# Patient Record
(demographics unavailable — no encounter records)

---

## 2024-12-06 NOTE — HISTORY OF PRESENT ILLNESS
[Former] : former [Home] : home [FreeTextEntry1] : Hx asthma. Has had asthma since childhood.    Hx of cold-induced urticaria; saw allergist Dr Efra Hollis; labwork done; given epipen.     Also found to have elevated RBCs. She showed me cbc on her phone from 5/8/24 and hgb >15. Repeat reviewed below.   Began in Oct 2024, multiple bouts of cough, congestion. Received abx and prednisone. Prednisone helps. When she is off prednisone, it come back. On flovent. Got breztri from PCP and using it prn which she says help. Albuterol prn.   Stevinson 9/10/24 with mild obstruction.   FENO 12/5/24 is 10 ppb.  Has a cat at home. Says it is hypoallergenic.  Stopped smoking Sept 2016.    She had a CT chest done 12/8/16 which showed nodules. F/U ct done 2019 was w/o change.   She snores. Denies EDS with ESS only 7. HST done 7/15/24 showing mild JENNIFER.    [TextBox_100] : 7/15/24 [TextBox_108] : 6.1 [TextBox_112] : 1.4 [ESS] : 7

## 2024-12-06 NOTE — REVIEW OF SYSTEMS
[de-identified] : stoma is healing well, no air escape  [Normal] : mucosa is normal [Midline] : trachea located in midline position [As Noted in HPI] : as noted in HPI [Negative] : Sleep Disorder [Fever] : no fever [Chills] : no chills [Fatigue] : no fatigue

## 2024-12-06 NOTE — PHYSICAL EXAM
[General Appearance - In No Acute Distress] : no acute distress [Normal Conjunctiva] : the conjunctiva exhibited no abnormalities [Low Lying Soft Palate] : low lying soft palate [Neck Appearance] : the appearance of the neck was normal [] : the neck was supple [Jugular Venous Distention Increased] : there was no jugular-venous distention [Heart Rate And Rhythm] : heart rate and rhythm were normal [Heart Sounds] : normal S1 and S2 [Bowel Sounds] : normal bowel sounds [Abdomen Soft] : soft [Abdomen Tenderness] : non-tender [Abnormal Walk] : normal gait [Nail Clubbing] : no clubbing of the fingernails [Cyanosis, Localized] : no localized cyanosis [Cranial Nerves] : cranial nerves 2-12 were intact [No Focal Deficits] : no focal deficits [Oriented To Time, Place, And Person] : oriented to person, place, and time [Impaired Insight] : insight and judgment were intact [Affect] : the affect was normal [Normal Rate] : the respiratory rate was normal [Rate ___] : at [unfilled] breaths per minute [Normal Rhythm/Effort] : normal respiratory rhythm and effort [Rales / Crackles Bilateral] : no rales or crackles were heard [Wheezing Bilaterally] : no wheezing was heard [Rhonchi Bilateral] : no rhonchi were heard [Normal Oropharynx] : abnormal oropharynx [Acc Muscles Use] : no accessory muscle use [Air Hunger] : no air hunger [Prolonged Exp Time] : expiratory time was not prolonged

## 2024-12-06 NOTE — PROCEDURE
[FreeTextEntry1] : samantha done today 12/6/24: normal  samantha done 9/10/24: minimal obstruction  review of sleep study  PFT done 1/8/21: no obstruction. TLC normal. DLCO normal.

## 2024-12-06 NOTE — CONSULT LETTER
[Dear  ___] : Dear  [unfilled], [Courtesy Letter:] : I had the pleasure of seeing your patient, [unfilled], in my office today. [Consult Closing:] : Thank you very much for allowing me to participate in the care of this patient.  If you have any questions, please do not hesitate to contact me. [Howard Martin MD] : Howard Martin MD

## 2024-12-06 NOTE — PLAN
[TextEntry] : Switch to breztri as maintenanc for now. That is instead of flovent. Rinse mouth after use. Albuterol prn. Flonase. CXR. No need to trt this mild JENNIFER given lack of symptoms/comorbidities. Allergy f/u. No need for further routine f/u of chest nodules.  Annual flu vaccine.

## 2025-03-07 NOTE — PLAN
[TextEntry] : Continue breztri as maintenance for now. Rinse mouth after use. Albuterol prn. Flonase.  Gave info on Dr Sharma. No need to trt this mild JENNIFER given lack of symptoms/comorbidities. Allergy f/u. No need for further routine f/u of chest nodules.  Annual flu vaccine.

## 2025-03-07 NOTE — PROCEDURE
[FreeTextEntry1] : samantha done today 12/6/24: normal  samantha done 9/10/24: minimal obstruction  review of sleep study  PFT done 1/8/21: no obstruction. TLC normal. DLCO normal.   CXR done 12/6/24: no active dz

## 2025-03-07 NOTE — REVIEW OF SYSTEMS
[Fever] : no fever [Chills] : no chills [Fatigue] : no fatigue [As Noted in HPI] : as noted in HPI [Negative] : Sleep Disorder

## 2025-03-07 NOTE — HISTORY OF PRESENT ILLNESS
[FreeTextEntry1] : Hx asthma. Has had asthma since childhood.    Hx of cold-induced urticaria; saw allergist Dr Efra Hollis; labwork done; given epipen.     Also found to have elevated RBCs. She showed me cbc on her phone from 5/8/24 and hgb >15. Repeat reviewed below.   This winter again has had multiple bouts of cough, congestion. Received abx and prednisone. Now improving. Still with episodes of cough, tickle in throat.  On breztri now. Taking it BID.  Albuterol prn.   Rishi 9/10/24 with mild obstruction.   FENO 12/5/24 is 10 ppb.  Has a cat at home. Says it is hypoallergenic.  Stopped smoking Sept 2016.    She had a CT chest done 12/8/16 which showed nodules. F/U ct done 2019 was w/o change.   She snores. Denies EDS with ESS only 7. HST done 7/15/24 showing mild JENNIFER.    [Former] : former [Home] : home [TextBox_100] : 7/15/24 [TextBox_108] : 6.1 [TextBox_112] : 1.4 [ESS] : 7

## 2025-03-07 NOTE — PHYSICAL EXAM
[General Appearance - In No Acute Distress] : no acute distress [Normal Conjunctiva] : the conjunctiva exhibited no abnormalities [Normal Oropharynx] : abnormal oropharynx [Low Lying Soft Palate] : low lying soft palate [Neck Appearance] : the appearance of the neck was normal [] : the neck was supple [Jugular Venous Distention Increased] : there was no jugular-venous distention [Heart Rate And Rhythm] : heart rate and rhythm were normal [Heart Sounds] : normal S1 and S2 [Bowel Sounds] : normal bowel sounds [Abdomen Soft] : soft [Abdomen Tenderness] : non-tender [Abnormal Walk] : normal gait [Nail Clubbing] : no clubbing of the fingernails [Cyanosis, Localized] : no localized cyanosis [Cranial Nerves] : cranial nerves 2-12 were intact [No Focal Deficits] : no focal deficits [Oriented To Time, Place, And Person] : oriented to person, place, and time [Impaired Insight] : insight and judgment were intact [Affect] : the affect was normal [Normal Rate] : the respiratory rate was normal [Rate ___] : at [unfilled] breaths per minute [Normal Rhythm/Effort] : normal respiratory rhythm and effort [Acc Muscles Use] : no accessory muscle use [Air Hunger] : no air hunger [Prolonged Exp Time] : expiratory time was not prolonged [Rales / Crackles Bilateral] : no rales or crackles were heard [Wheezing Bilaterally] : no wheezing was heard [Rhonchi Bilateral] : no rhonchi were heard